# Patient Record
Sex: FEMALE | Race: ASIAN | NOT HISPANIC OR LATINO | URBAN - METROPOLITAN AREA
[De-identification: names, ages, dates, MRNs, and addresses within clinical notes are randomized per-mention and may not be internally consistent; named-entity substitution may affect disease eponyms.]

---

## 2017-01-23 ENCOUNTER — ANESTHESIA - HEALTHEAST (OUTPATIENT)
Dept: INTENSIVE CARE | Facility: HOSPITAL | Age: 57
End: 2017-01-23

## 2017-02-15 ENCOUNTER — AMBULATORY - HEALTHEAST (OUTPATIENT)
Dept: PULMONOLOGY | Facility: OTHER | Age: 57
End: 2017-02-15

## 2017-02-15 ENCOUNTER — COMMUNICATION - HEALTHEAST (OUTPATIENT)
Dept: PULMONOLOGY | Facility: OTHER | Age: 57
End: 2017-02-15

## 2017-02-15 DIAGNOSIS — J44.9 COPD (CHRONIC OBSTRUCTIVE PULMONARY DISEASE) (H): ICD-10-CM

## 2017-02-16 ENCOUNTER — COMMUNICATION - HEALTHEAST (OUTPATIENT)
Dept: PULMONOLOGY | Facility: OTHER | Age: 57
End: 2017-02-16

## 2017-02-24 ENCOUNTER — RECORDS - HEALTHEAST (OUTPATIENT)
Dept: ADMINISTRATIVE | Facility: OTHER | Age: 57
End: 2017-02-24

## 2017-03-21 ENCOUNTER — RECORDS - HEALTHEAST (OUTPATIENT)
Dept: ADMINISTRATIVE | Facility: OTHER | Age: 57
End: 2017-03-21

## 2019-04-05 ASSESSMENT — MIFFLIN-ST. JEOR: SCORE: 700.61

## 2019-04-07 ENCOUNTER — ANESTHESIA - HEALTHEAST (OUTPATIENT)
Dept: INTENSIVE CARE | Facility: HOSPITAL | Age: 59
End: 2019-04-07

## 2019-04-07 ENCOUNTER — RECORDS - HEALTHEAST (OUTPATIENT)
Dept: INTENSIVE CARE | Facility: HOSPITAL | Age: 59
End: 2019-04-07

## 2019-04-08 ENCOUNTER — ANESTHESIA - HEALTHEAST (OUTPATIENT)
Dept: INTENSIVE CARE | Facility: HOSPITAL | Age: 59
End: 2019-04-08

## 2019-04-08 ENCOUNTER — RECORDS - HEALTHEAST (OUTPATIENT)
Dept: INTENSIVE CARE | Facility: HOSPITAL | Age: 59
End: 2019-04-08

## 2019-04-09 ASSESSMENT — MIFFLIN-ST. JEOR: SCORE: 708.32

## 2019-04-11 ASSESSMENT — MIFFLIN-ST. JEOR: SCORE: 733.27

## 2019-04-12 ASSESSMENT — MIFFLIN-ST. JEOR: SCORE: 746.42

## 2019-04-13 ASSESSMENT — MIFFLIN-ST. JEOR: SCORE: 742.79

## 2019-04-14 ASSESSMENT — MIFFLIN-ST. JEOR: SCORE: 741.89

## 2019-04-15 ASSESSMENT — MIFFLIN-ST. JEOR: SCORE: 712.4

## 2019-04-16 ASSESSMENT — MIFFLIN-ST. JEOR: SCORE: 704.24

## 2019-04-17 ASSESSMENT — MIFFLIN-ST. JEOR: SCORE: 692.9

## 2019-04-18 ASSESSMENT — MIFFLIN-ST. JEOR: SCORE: 693.35

## 2019-04-19 ENCOUNTER — RECORDS - HEALTHEAST (OUTPATIENT)
Dept: ADMINISTRATIVE | Facility: OTHER | Age: 59
End: 2019-04-19

## 2019-04-19 ASSESSMENT — MIFFLIN-ST. JEOR: SCORE: 690.63

## 2019-04-20 ASSESSMENT — MIFFLIN-ST. JEOR: SCORE: 689.72

## 2019-04-21 ASSESSMENT — MIFFLIN-ST. JEOR: SCORE: 687.91

## 2019-04-22 ASSESSMENT — MIFFLIN-ST. JEOR
SCORE: 514.63
SCORE: 682.38

## 2019-04-23 ENCOUNTER — ANESTHESIA - HEALTHEAST (OUTPATIENT)
Dept: SURGERY | Facility: HOSPITAL | Age: 59
End: 2019-04-23

## 2019-04-23 ENCOUNTER — SURGERY - HEALTHEAST (OUTPATIENT)
Dept: SURGERY | Facility: HOSPITAL | Age: 59
End: 2019-04-23

## 2019-04-23 ASSESSMENT — MIFFLIN-ST. JEOR: SCORE: 701.06

## 2019-04-24 ASSESSMENT — MIFFLIN-ST. JEOR: SCORE: 708.32

## 2019-04-25 ASSESSMENT — MIFFLIN-ST. JEOR: SCORE: 717.39

## 2019-04-26 ASSESSMENT — MIFFLIN-ST. JEOR: SCORE: 700.38

## 2019-04-27 ASSESSMENT — MIFFLIN-ST. JEOR: SCORE: 685.38

## 2019-04-28 ASSESSMENT — MIFFLIN-ST. JEOR: SCORE: 683.83

## 2019-04-29 ASSESSMENT — MIFFLIN-ST. JEOR: SCORE: 678.38

## 2021-05-27 NOTE — ANESTHESIA PROCEDURE NOTES
Emergent Intubation  Date/Time: 4/7/2019 5:05 AM    Performing CRNA: Aliza Payton CRNA  Indications: respiratory distress  Route: oral  Technique: direct  Laryngoscope size: Mac 2 (glidescope)  Tube type: cuffed  Cuff inflated: yes  Level of Difficulty: 0ETT to lip: 21 cm  Tube secured with: ETT schaffer  ETCO2 = Yes  Breath sounds: equal  SaO2 %: 91    Sign out given. CXR and sedation per primary care team.

## 2021-05-27 NOTE — ANESTHESIA CARE TRANSFER NOTE
Last vitals:   Vitals:    04/07/19 0410   BP:    Pulse: (!) 106   Resp: 21   Temp:    SpO2: 91%     Patient's level of consciousness is drowsy  Spontaneous respirations: no: ventilated via ett  Maintains airway independently: no: ett  Dentition unchanged: yes  Oropharynx: endotracheal tube in place    QCDR Measures:  ASA# 20 - Surgical No data recorded  PQRS# 430 - Adult PONV Prevention: NA - Not adult patient, not GA or 3 or more risk factors NOT present  ASA# 8 - Peds PONV Prevention: NA - Not pediatric patient, not GA or 2 or more risk factors NOT present  PQRS# 424 - Kim-op Temp Management: 4559F-1P - Emergency case or intentional hypothermia  PQRS# 426 - PACU Transfer Protocol:NA - Patient did not go to PACU  ASA# 14 - Acute Post-op Pain: NA - Patient under age 10y or did not go to PACU

## 2021-05-27 NOTE — ANESTHESIA PROCEDURE NOTES
Emergent Intubation  Date/Time: 4/8/2019 8:56 PM  Anesthesiologist: Adrian Gutierrez MD  Performing CRNA: Aliza Payton CRNA  Indications: Airway exchange 2/2 concern for potential mainstem intubation.  Route: oral  Intubation method: indirect.  Tube size: 7.0 mm  Tube type: cuffed  Cuff inflated: yes  Level of Difficulty: 0ETT to lip: 18 (at the gums) cm  Tube secured with: ETT schaffer  ETCO2 = Yes  Breath sounds: equal  SaO2 %: 98    Sign out given. CXR and sedation per primary care team.  Comments: Anesthesia was called to do an airway exchange as primary was concerned bc post bronch the ETT appeared to be only 0.5 cm from nasima and the cuff of the ETT was at the level of VC so they could not withdraw the ETT safely any further and concern for possible mainstem intubation and profound hypoxia and need for potential emergent exchange in the future. Chart, labs briefly reviewed with nursing and team at bedside. Plan to perform airway exchange of 7.5 mm subglottic drainage ETT to standard 7.0 mm oETT.    Standard monitors. Patient placed on 100% O2 on vent with assist from RT. Rocuronium 20 mg and Propofol 70 mg (30 mg and 40 mg) was administered. Glidescope #3 blade placed to visual oropharynx during exchanged. Hypori Airway exchange catheter (AEC) used. Lubricated airway exchange catheter advanced through the 7.5 mm ETT, cuff was deflated and ETT removed over the catheter. A 7.0 mm oETT advanced over the catheter through glottic opening under indirect visualization. Catheter removed and tube placement confirmed w/ +EtCO2 and bilateral breath sounds. Cuff pressure at 25 cmH20. Tube secured with RT assistance at 18 cm at the lips.    See nursing documentation for vitals.     CXR per primary team.  Ensured adequate ventilation and that no further anesthesia assistance was necessary before leaving patient bedside and primary team comfortable with hemodynamics following intubation.  Sedation and further orders and cares  per the primary team.

## 2021-05-28 NOTE — ANESTHESIA PREPROCEDURE EVALUATION
Anesthesia Evaluation      Patient summary reviewed   No history of anesthetic complications     Airway    Pulmonary    (+) pneumonia (VAP), COPD, asthma                           Cardiovascular   Exercise tolerance: < 4 METS  (+) hypertension, CHF, cardiomyopathy,     ECG reviewed (4/14/19: NSR, 84 bpm)     ROS comment: Echo 4/5/19:    Left ventricle ejection fraction is increased. The calculated left ventricular ejection fraction is 78%.    Normal right ventricular systolic function.    Mild to moderate aortic regurgitation    Mild to moderate tricuspid regurgitation. Moderate to severe pulmonary hypertension suggested. Estimate of RV systolic pressure 60 mmHg plus right atrial pressure.    Anterior pericardial effusion. No obvious echo findings to suggest tamponade.    When compared to the previous study dated 1/18/2017, the anterior pericardial effusion was noted at that time as well.     Neuro/Psych - negative ROS     Endo/Other    (+) diabetes mellitus, hypothyroidism,      GI/Hepatic/Renal - negative ROS      Other findings: 58yoF with history of bronchiectasis and emphysema who presented with severe right lower lobe pneumonia causing acute hypoxic respiratory failure requiring intubation 4/7/19 and has been unable to extubate since then.    Hx of prior tracheostomy with plan for trach today. Of note when she went to IR today for GJ placement she developed hypotension requiring placement on norepinephrine gtt 2 mcg/min.    Labs:  4/23/19:  Hgb 7.2, Plt 119  Na 137, K 4.7, BUN 28, Cr 0.67, Mag 1.7  INR 1.17      Dental                         Anesthesia Plan  Planned anesthetic: general endotracheal  Transport pt from ICU and connect to anesthesia circuit.  Reduced FiO2 to < 30% during surgical dissection of the airway (as tolerated)   Decadron and zofran for PONV ppx.  Continue precedex  Small dose fentanyl  ASA 4   Induction: intravenous   Anesthetic plan and risks discussed with: patient, spouse and   services used  Anesthesia plan special considerations: antiemetics,   Post-op plan: extended intubation/vent support

## 2021-05-28 NOTE — ANESTHESIA POSTPROCEDURE EVALUATION
Patient: Delia Kwok  REVISION TRACHEOSTOMY  Anesthesia type: general    Patient location: ICU  Last vitals:   Vitals Value Taken Time   BP 90/50 4/24/2019  6:00 AM   Temp 36.3  C (97.3  F) 4/24/2019  5:39 AM   Pulse 65 4/24/2019  6:07 AM   Resp 20 4/24/2019  6:07 AM   SpO2 95 % 4/24/2019  6:07 AM   Vitals shown include unvalidated device data.  Post vital signs: stable  Level of consciousness: awake and responds to simple questions  Post-anesthesia pain: pain controlled  Post-anesthesia nausea and vomiting: no  Pulmonary: unassisted, return to baseline  Cardiovascular: stable and blood pressure at baseline  Hydration: adequate  Anesthetic events: no    QCDR Measures:  ASA# 11 - Kim-op Cardiac Arrest: ASA11B - Patient did NOT experience unanticipated cardiac arrest  ASA# 12 - Kim-op Mortality Rate: ASA12B - Patient did NOT die  ASA# 13 - PACU Re-Intubation Rate: ASA13B - Patient did NOT require a new airway mgmt  ASA# 10 - Composite Anes Safety: ASA10A - No serious adverse event    Additional Notes:

## 2021-05-28 NOTE — ANESTHESIA CARE TRANSFER NOTE
Last vitals:   Vitals:    04/23/19 1830   BP: 156/69   Pulse: 77   Resp: 18   Temp: 36  C (96.8  F)   SpO2: 98%      Patient's level of consciousness is drowsy  Spontaneous respirations: no: Trach in place  Maintains airway independently: no: Trach in place  Dentition unchanged: yes  Oropharynx: oropharynx clear of all foreign objects    QCDR Measures:  ASA# 20 - Surgical Safety Checklist: WHO surgical safety checklist completed prior to induction    PQRS# 430 - Adult PONV Prevention: 4558F - Pt received => 2 anti-emetic agents (different classes) preop & intraop  ASA# 8 - Peds PONV Prevention: NA - Not pediatric patient, not GA or 2 or more risk factors NOT present  PQRS# 424 - Kim-op Temp Management: 4559F - At least one body temp DOCUMENTED => 35.5C or 95.9F within required timeframe  PQRS# 426 - PACU Transfer Protocol: - Transfer of care checklist used  ASA# 14 - Acute Post-op Pain: ASA14B - Patient did NOT experience pain >= 7 out of 10

## 2021-06-01 ENCOUNTER — RECORDS - HEALTHEAST (OUTPATIENT)
Dept: ADMINISTRATIVE | Facility: CLINIC | Age: 61
End: 2021-06-01

## 2021-06-02 ENCOUNTER — RECORDS - HEALTHEAST (OUTPATIENT)
Dept: ADMINISTRATIVE | Facility: CLINIC | Age: 61
End: 2021-06-02

## 2021-06-03 ENCOUNTER — RECORDS - HEALTHEAST (OUTPATIENT)
Dept: ADMINISTRATIVE | Facility: CLINIC | Age: 61
End: 2021-06-03

## 2021-06-03 VITALS — HEIGHT: 55 IN | WEIGHT: 66.8 LBS | BODY MASS INDEX: 15.46 KG/M2

## 2021-06-08 NOTE — ANESTHESIA PROCEDURE NOTES
Emergent Intubation  Date/Time: 1/23/2017 3:50 AM    Performing CRNA: CUBA VAZQUEZ  Indications: respiratory failure  Route: oral  Technique: direct  Laryngoscope size: Glidescope Blade 3.  Tube size: 7.5 mm  Tube type: cuffed  Cuff inflated: yes  Level of Difficulty: 0ETT to lip: 18 cm  Tube secured with: ETT schaffer  ETCO2 = Yes  Breath sounds: equal  SaO2 %: 100    Sign out given. CXR and sedation per primary care team.

## 2021-06-15 PROBLEM — J44.1 COPD WITH EXACERBATION (H): Status: ACTIVE | Noted: 2017-01-17

## 2021-06-15 PROBLEM — J44.1 ACUTE EXACERBATION OF CHRONIC OBSTRUCTIVE PULMONARY DISEASE (COPD) (H): Status: ACTIVE | Noted: 2017-03-31

## 2021-06-15 PROBLEM — R65.21 SEVERE SEPSIS WITH SEPTIC SHOCK (CODE) (H): Status: ACTIVE | Noted: 2017-01-17

## 2021-06-15 PROBLEM — J96.01 ACUTE RESPIRATORY FAILURE WITH HYPOXIA (H): Status: ACTIVE | Noted: 2017-01-30

## 2021-06-16 PROBLEM — J18.9 PNEUMONIA: Status: ACTIVE | Noted: 2019-04-05

## 2021-06-16 PROBLEM — J96.90 RESPIRATORY FAILURE (H): Status: ACTIVE | Noted: 2019-04-05

## 2021-07-03 NOTE — ADDENDUM NOTE
Addendum Note by Keyla Dimas CRNA at 1/23/2017  4:13 AM     Author: Keyla Dimas CRNA Service: -- Author Type: Nurse Anesthetist    Filed: 1/23/2017  4:13 AM Date of Service: 1/23/2017  4:13 AM Status: Signed    : Keyla Dimas CRNA (Nurse Anesthetist)       Addendum  created 01/23/17 0413 by Keyla Dimas CRNA    Anesthesia Event edited, Anesthesia Intra Blocks edited, Anesthesia Intra LDAs edited, Anesthesia Intra Meds edited, Anesthesia Staff edited, Child order released for a procedure order, LDA created via procedure documentation, LDA properties accepted, Orders acknowledged in Narrator, Procedure Event Log accessed, Sign clinical note

## 2021-07-13 ENCOUNTER — RECORDS - HEALTHEAST (OUTPATIENT)
Dept: ADMINISTRATIVE | Facility: CLINIC | Age: 61
End: 2021-07-13